# Patient Record
Sex: MALE | Race: WHITE | NOT HISPANIC OR LATINO | Employment: FULL TIME | URBAN - METROPOLITAN AREA
[De-identification: names, ages, dates, MRNs, and addresses within clinical notes are randomized per-mention and may not be internally consistent; named-entity substitution may affect disease eponyms.]

---

## 2018-04-05 ENCOUNTER — TELEPHONE (OUTPATIENT)
Dept: INTERNAL MEDICINE | Facility: CLINIC | Age: 38
End: 2018-04-05

## 2018-04-05 NOTE — TELEPHONE ENCOUNTER
----- Message from Kayla Arechiga sent at 4/5/2018  3:48 PM CDT -----  Contact: Pt   Pt request call back to discuss getting a refill on his Valacyclovir 1 gram without seeing him in office. .[phone] (home)

## 2018-04-06 ENCOUNTER — LAB VISIT (OUTPATIENT)
Dept: LAB | Facility: HOSPITAL | Age: 38
End: 2018-04-06
Attending: NURSE PRACTITIONER
Payer: COMMERCIAL

## 2018-04-06 ENCOUNTER — OFFICE VISIT (OUTPATIENT)
Dept: INTERNAL MEDICINE | Facility: CLINIC | Age: 38
End: 2018-04-06
Payer: COMMERCIAL

## 2018-04-06 VITALS
DIASTOLIC BLOOD PRESSURE: 78 MMHG | TEMPERATURE: 98 F | HEIGHT: 72 IN | HEART RATE: 68 BPM | SYSTOLIC BLOOD PRESSURE: 134 MMHG | OXYGEN SATURATION: 97 % | WEIGHT: 165.13 LBS | BODY MASS INDEX: 22.37 KG/M2

## 2018-04-06 DIAGNOSIS — B00.9 HERPES SIMPLEX TYPE 2 INFECTION: ICD-10-CM

## 2018-04-06 DIAGNOSIS — G47.26 SHIFT WORK SLEEP DISORDER: Primary | ICD-10-CM

## 2018-04-06 LAB
ANION GAP SERPL CALC-SCNC: 9 MMOL/L
BUN SERPL-MCNC: 16 MG/DL
CALCIUM SERPL-MCNC: 9.6 MG/DL
CHLORIDE SERPL-SCNC: 103 MMOL/L
CO2 SERPL-SCNC: 27 MMOL/L
CREAT SERPL-MCNC: 1.2 MG/DL
EST. GFR  (AFRICAN AMERICAN): >60 ML/MIN/1.73 M^2
EST. GFR  (NON AFRICAN AMERICAN): >60 ML/MIN/1.73 M^2
GLUCOSE SERPL-MCNC: 95 MG/DL
POTASSIUM SERPL-SCNC: 3.9 MMOL/L
SODIUM SERPL-SCNC: 139 MMOL/L

## 2018-04-06 PROCEDURE — 36415 COLL VENOUS BLD VENIPUNCTURE: CPT | Mod: PO

## 2018-04-06 PROCEDURE — 99999 PR PBB SHADOW E&M-EST. PATIENT-LVL III: CPT | Mod: PBBFAC,,, | Performed by: NURSE PRACTITIONER

## 2018-04-06 PROCEDURE — 99203 OFFICE O/P NEW LOW 30 MIN: CPT | Mod: S$GLB,,, | Performed by: NURSE PRACTITIONER

## 2018-04-06 PROCEDURE — 80048 BASIC METABOLIC PNL TOTAL CA: CPT | Mod: PO

## 2018-04-06 RX ORDER — TRAZODONE HYDROCHLORIDE 100 MG/1
100 TABLET ORAL NIGHTLY
Qty: 30 TABLET | Refills: 0 | Status: SHIPPED | OUTPATIENT
Start: 2018-04-06 | End: 2019-05-30

## 2018-04-06 RX ORDER — VALACYCLOVIR HYDROCHLORIDE 1 G/1
1000 TABLET, FILM COATED ORAL DAILY
COMMUNITY
End: 2018-04-06 | Stop reason: SDUPTHER

## 2018-04-06 RX ORDER — VALACYCLOVIR HYDROCHLORIDE 1 G/1
1000 TABLET, FILM COATED ORAL DAILY
Qty: 90 TABLET | Refills: 3 | Status: SHIPPED | OUTPATIENT
Start: 2018-04-06 | End: 2019-04-30 | Stop reason: SDUPTHER

## 2018-04-06 NOTE — PROGRESS NOTES
Subjective:       Patient ID: Trey Austin is a 38 y.o. male.    Chief Complaint: Medication Refill (Dr. Narvaez pt)    Pt here for refill and insomnia    No acute symptoms today     Refill- on valcyclovir. Has taken it daily for quite some time for prevention of Herpes (genital) outbreaks. No current outbreak.     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 He usually takes Melatonin, but would like to try Trazodone for when he works nights.       Review of Systems   Constitutional: Negative for activity change, appetite change, chills, diaphoresis, fatigue, fever and unexpected weight change.   HENT: Negative for congestion, ear pain, postnasal drip, rhinorrhea, sinus pain, sinus pressure, sneezing, sore throat, tinnitus, trouble swallowing and voice change.    Eyes: Negative for photophobia, pain and visual disturbance.   Respiratory: Negative for cough, chest tightness, shortness of breath and wheezing.    Cardiovascular: Negative for chest pain, palpitations and leg swelling.   Gastrointestinal: Negative for abdominal distention, abdominal pain, blood in stool, constipation, diarrhea, nausea and vomiting.   Genitourinary: Negative for dysuria and frequency.   Musculoskeletal: Negative for arthralgias, back pain, joint swelling, neck pain and neck stiffness.   Neurological: Negative for dizziness, tremors, seizures, syncope, facial asymmetry, speech difficulty, weakness, light-headedness, numbness and headaches.   Psychiatric/Behavioral: Positive for sleep disturbance. Negative for confusion.       Objective:      Physical Exam   Constitutional: He is oriented to person, place, and time. He appears well-developed and well-nourished.   HENT:   Head: Normocephalic and atraumatic.   Right Ear: Tympanic membrane normal.   Left Ear: Tympanic membrane normal.   Eyes: Conjunctivae and EOM are normal. Pupils are equal, round, and reactive to light.   Neck: Normal range  of motion. Neck supple.   Cardiovascular: Normal rate, regular rhythm, normal heart sounds and intact distal pulses.    Pulmonary/Chest: Effort normal and breath sounds normal.   Abdominal: Soft. Bowel sounds are normal.   Musculoskeletal: Normal range of motion.   Neurological: He is alert and oriented to person, place, and time.   Skin: Skin is warm and dry.   Psychiatric: He has a normal mood and affect.       Assessment:       1. Shift work sleep disorder    2. Herpes simplex type 2 infection        Plan:   Shift work sleep disorder  -     traZODone (DESYREL) 100 MG tablet; Take 1 tablet (100 mg total) by mouth every evening.  Dispense: 30 tablet; Refill: 0    Herpes simplex type 2 infection  -     valACYclovir (VALTREX) 1000 MG tablet; Take 1 tablet (1,000 mg total) by mouth once daily.  Dispense: 90 tablet; Refill: 3  -     Basic metabolic panel; Future; Expected date: 04/06/2018      Labs today-review to follow  meds as above  Side effects of trazodone discuss. Discontinue if unwanted side effects or adverse effects occur  Follow up with PCP

## 2019-04-30 DIAGNOSIS — B00.9 HERPES SIMPLEX TYPE 2 INFECTION: ICD-10-CM

## 2019-04-30 RX ORDER — VALACYCLOVIR HYDROCHLORIDE 1 G/1
TABLET, FILM COATED ORAL
Qty: 90 TABLET | Refills: 0 | Status: SHIPPED | OUTPATIENT
Start: 2019-04-30 | End: 2019-05-30 | Stop reason: SDUPTHER

## 2019-05-30 ENCOUNTER — OFFICE VISIT (OUTPATIENT)
Dept: INTERNAL MEDICINE | Facility: CLINIC | Age: 39
End: 2019-05-30
Payer: COMMERCIAL

## 2019-05-30 ENCOUNTER — LAB VISIT (OUTPATIENT)
Dept: LAB | Facility: HOSPITAL | Age: 39
End: 2019-05-30
Attending: FAMILY MEDICINE
Payer: COMMERCIAL

## 2019-05-30 VITALS
SYSTOLIC BLOOD PRESSURE: 112 MMHG | DIASTOLIC BLOOD PRESSURE: 78 MMHG | HEART RATE: 67 BPM | TEMPERATURE: 98 F | BODY MASS INDEX: 22.4 KG/M2 | WEIGHT: 165.38 LBS | OXYGEN SATURATION: 98 % | HEIGHT: 72 IN

## 2019-05-30 DIAGNOSIS — Z00.00 PREVENTATIVE HEALTH CARE: ICD-10-CM

## 2019-05-30 DIAGNOSIS — Z29.9 PREVENTIVE MEASURE: Primary | ICD-10-CM

## 2019-05-30 DIAGNOSIS — G47.26 SHIFT WORK SLEEP DISORDER: ICD-10-CM

## 2019-05-30 DIAGNOSIS — Z13.220 SCREENING FOR LIPID DISORDERS: ICD-10-CM

## 2019-05-30 DIAGNOSIS — B00.9 HERPES SIMPLEX TYPE 2 INFECTION: ICD-10-CM

## 2019-05-30 LAB
CHOLEST SERPL-MCNC: 213 MG/DL (ref 120–199)
CHOLEST/HDLC SERPL: 3.3 {RATIO} (ref 2–5)
HDLC SERPL-MCNC: 65 MG/DL (ref 40–75)
HDLC SERPL: 30.5 % (ref 20–50)
LDLC SERPL CALC-MCNC: 130.2 MG/DL (ref 63–159)
NONHDLC SERPL-MCNC: 148 MG/DL
TRIGL SERPL-MCNC: 89 MG/DL (ref 30–150)

## 2019-05-30 PROCEDURE — 36415 COLL VENOUS BLD VENIPUNCTURE: CPT

## 2019-05-30 PROCEDURE — 99999 PR PBB SHADOW E&M-EST. PATIENT-LVL III: CPT | Mod: PBBFAC,,, | Performed by: FAMILY MEDICINE

## 2019-05-30 PROCEDURE — 99999 PR PBB SHADOW E&M-EST. PATIENT-LVL III: ICD-10-PCS | Mod: PBBFAC,,, | Performed by: FAMILY MEDICINE

## 2019-05-30 PROCEDURE — 90715 TDAP VACCINE 7 YRS/> IM: CPT | Mod: S$GLB,,, | Performed by: FAMILY MEDICINE

## 2019-05-30 PROCEDURE — 90471 TDAP VACCINE GREATER THAN OR EQUAL TO 7YO IM: ICD-10-PCS | Mod: S$GLB,,, | Performed by: FAMILY MEDICINE

## 2019-05-30 PROCEDURE — 90715 TDAP VACCINE GREATER THAN OR EQUAL TO 7YO IM: ICD-10-PCS | Mod: S$GLB,,, | Performed by: FAMILY MEDICINE

## 2019-05-30 PROCEDURE — 99396 PR PREVENTIVE VISIT,EST,40-64: ICD-10-PCS | Mod: 25,S$GLB,, | Performed by: FAMILY MEDICINE

## 2019-05-30 PROCEDURE — 99396 PREV VISIT EST AGE 40-64: CPT | Mod: 25,S$GLB,, | Performed by: FAMILY MEDICINE

## 2019-05-30 PROCEDURE — 90471 IMMUNIZATION ADMIN: CPT | Mod: S$GLB,,, | Performed by: FAMILY MEDICINE

## 2019-05-30 PROCEDURE — 80061 LIPID PANEL: CPT

## 2019-05-30 RX ORDER — MODAFINIL 200 MG/1
200 TABLET ORAL DAILY
Qty: 30 TABLET | Refills: 0 | Status: SHIPPED | OUTPATIENT
Start: 2019-05-30 | End: 2019-06-29

## 2019-05-30 RX ORDER — VALACYCLOVIR HYDROCHLORIDE 1 G/1
1000 TABLET, FILM COATED ORAL DAILY
Qty: 90 TABLET | Refills: 3 | Status: SHIPPED | OUTPATIENT
Start: 2019-05-30 | End: 2020-06-25 | Stop reason: SDUPTHER

## 2019-05-30 RX ORDER — ZOLPIDEM TARTRATE 10 MG/1
TABLET ORAL
Qty: 30 TABLET | Refills: 1 | Status: SHIPPED | OUTPATIENT
Start: 2019-05-30 | End: 2020-06-25 | Stop reason: SDUPTHER

## 2019-05-30 NOTE — ASSESSMENT & PLAN NOTE
He works shift work, and he reports that whenever he has to transition from day to night shift, he has difficulty realigned in his sleep-wake pattern.  He has been using modafinil 200 milligrams only when he makes the transition, typically no more than a couple of tablets per month.  He says that the trazodone causes side effects of dry mouth and feeling groggy.  He has used alprazolam in the past, which apparently worked well and he tolerated it well, but he wants to try a non-benzodiazepine, such as zolpidem.

## 2019-05-30 NOTE — PROGRESS NOTES
CHIEF COMPLAINT  Establish Care and Annual Exam      HISTORY OF PRESENT ILLNESS (PREVENTIVE SERVICES)    HEALTH MAINTENANCE INTERVENTIONS - UP TO DATE  Health Maintenance Topics with due status: Not Due       Topic Last Completion Date    Influenza Vaccine Not Due       HEALTH MAINTENANCE INTERVENTIONS - DUE OR DUE SOON  Health Maintenance Due   Topic Date Due    Lipid Panel  1979    TETANUS VACCINE  01/04/1997       · HYPERTENSION SCREENING: negative  BP Readings from Last 3 Encounters:   05/30/19 112/78   04/06/18 134/78        · OBESITY SCREENING: negative  Wt Readings from Last 3 Encounters:   05/30/19 75 kg (165 lb 5.5 oz)   04/06/18 74.9 kg (165 lb 2 oz)     BMI Readings from Last 3 Encounters:   05/30/19 22.42 kg/m²   04/06/18 22.39 kg/m²        · DYSLIPIDEMIA SCREENING: ordered  No results found for: CHOL, TRIG, HDL, LDLCALC, NONHDLCHOL    · DIABETES SCREENING: current - not needed at this time  Lab Results   Component Value Date    GLU 95 04/06/2018       · HIV SCREENING: offered, he declined   No results found for: UHA25RIPC     · TOBACCO USE SCREENING: negative  He  reports that he has quit smoking. He has never used smokeless tobacco.    · ALCOHOL MISUSE SCREENING: negative  He  has no alcohol history on file.    · DEPRESSION SCREENING: negative    · IMMUNIZATIONS: reported as up to date according to current CDC guidelines except for those ordered today.    · VISION SCREENING: current - not needed at this time    · ASPIRIN: not indicated    · ABDOMINAL AORTIC ANEURYSM SCREENING: not indicated    · COLORECTAL CANCER SCREENING: not indicated    · PROSTATE CANCER SCREENING: not indicated  No results found for: PSA     Problem List Items Addressed This Visit        ID    Herpes simplex type 2 infection (genital) (Chronic)    Current Assessment & Plan     Suppressed to only a couple of outbreaks per year on valacyclovir 1000 milligrams daily.  He wants to continue this regimen.         Relevant  Medications    valACYclovir (VALTREX) 1000 MG tablet       Other    Shift work sleep disorder    Current Assessment & Plan     He works shift work, and he reports that whenever he has to transition from day to night shift, he has difficulty realigned in his sleep-wake pattern.  He has been using modafinil 200 milligrams only when he makes the transition, typically no more than a couple of tablets per month.  He says that the trazodone causes side effects of dry mouth and feeling groggy.  He has used alprazolam in the past, which apparently worked well and he tolerated it well, but he wants to try a non-benzodiazepine, such as zolpidem.         Relevant Medications    modafinil (PROVIGIL) 200 MG Tab    zolpidem (AMBIEN) 10 mg Tab      Other Visit Diagnoses     Preventive measure    -  Primary    Relevant Orders    (In Office Administered) Tdap Vaccine (Completed)    Preventative health care        Screening for lipid disorders        Relevant Orders    Lipid panel          REVIEW OF SYSTEMS  CONSTITUTIONAL: No fever reported.  CARDIOVASCULAR: No angina reported.  PULMONARY: No hemoptysis reported.  PSYCHIATRIC: No mood instability reported.  NEUROLOGIC: No seizures reported.  ENDOCRINE: No polydipsia reported.  GASTROINTESTINAL: No blood in stool reported.  GENITOURINARY: No hematuria reported.  ENT: No acute hearing changes reported.  OPHTHALMOLOGIC: No acute vision changes reported.  HEMATOLOGIC: No bleeding problems reported.  MUSCULOSKELETAL: No recent injuries reported.  DERMATOLOGIC: No rash reported.  REMAINDER OF COMPLETE REVIEW OF SYSTEMS is negative or noncontributory to present illness except as noted herein.     PHYSICAL EXAM  Vitals:    05/30/19 1012   BP: 112/78   BP Location: Right arm   Patient Position: Sitting   Pulse: 67   Temp: 97.7 °F (36.5 °C)   TempSrc: Tympanic   SpO2: 98%   Weight: 75 kg (165 lb 5.5 oz)   Height: 6' (1.829 m)     CONSTITUTIONAL: Vital signs noted. No apparent distress. Does  not appear acutely ill or septic. Appears adequately hydrated.  EYE: Sclerae anicteric. Lids and conjunctiva unremarkable.  ENT: External ENT unremarkable. Oropharynx moist. Ear canals had excessive cerumen bilaterally, which was easily removed with curette without complication.  Subsequent exam revealed clear canals and normal tympanic membranes normal. Hearing grossly normal.   NECK: Trachea midline. Thyroid nontender.  PULM: Lungs clear. Breathing unlabored.  CV: Auscultation reveals regular rate and rhythm without murmur, gallop or rub. No carotid bruit.  GI: Soft and nontender. Bowel sounds normal.  DERM: Skin warm and moist with normal turgor.  PSYCH: Alert and oriented x 3. Mood is grossly neutral. Affect appropriate. Judgment and insight not grossly compromised.    Follow up in about 1 year (around 5/30/2020) for wellness and preventive services.    ABOUT THIS DOCUMENTATION:  · Documentation entered by me for this encounter was done in part using speech-recognition technology. Although I have made an effort to ensure accuracy, malapropisms may exist and should be interpreted in context.                        LEIGHTON Narvaez MD

## 2019-05-30 NOTE — ASSESSMENT & PLAN NOTE
Suppressed to only a couple of outbreaks per year on valacyclovir 1000 milligrams daily.  He wants to continue this regimen.

## 2019-08-15 NOTE — PROGRESS NOTES
"Trey Limon.      I'm happy to report that these test results are NORMAL or at least ACCEPTABLE.    Want to learn more about your test results and what they mean? It's as simple as 1, 2, 3.     (1) Log in to your [facility] account at [email or web address]     (2) From the "View test results" tab, click on the test you want to know more about.     (3) Click on the "About This Test" link.    We can discuss your test results further at your next appointment.  If you have any questions, be sure to write them down and bring them to your appointment.  If you have any urgent questions in the meantime, please send me a message using [facility], or you can call my office at [phone].    Thanks for letting me care for you, thanks for trusting us with your healthcare needs, and thanks for using [facility].    Sincerely,    ELENO Narvaez MD"

## 2020-06-16 DIAGNOSIS — B00.9 HERPES SIMPLEX TYPE 2 INFECTION: ICD-10-CM

## 2020-06-25 ENCOUNTER — OFFICE VISIT (OUTPATIENT)
Dept: INTERNAL MEDICINE | Facility: CLINIC | Age: 40
End: 2020-06-25
Payer: COMMERCIAL

## 2020-06-25 DIAGNOSIS — G47.26 SHIFT WORK SLEEP DISORDER: ICD-10-CM

## 2020-06-25 DIAGNOSIS — B00.9 HERPES SIMPLEX TYPE 2 INFECTION: ICD-10-CM

## 2020-06-25 DIAGNOSIS — B00.9 HERPES SIMPLEX TYPE 2 INFECTION: Chronic | ICD-10-CM

## 2020-06-25 PROCEDURE — 99213 OFFICE O/P EST LOW 20 MIN: CPT | Mod: 95,,, | Performed by: FAMILY MEDICINE

## 2020-06-25 PROCEDURE — 99213 PR OFFICE/OUTPT VISIT, EST, LEVL III, 20-29 MIN: ICD-10-PCS | Mod: 95,,, | Performed by: FAMILY MEDICINE

## 2020-06-25 RX ORDER — VALACYCLOVIR HYDROCHLORIDE 1 G/1
1000 TABLET, FILM COATED ORAL DAILY
Qty: 90 TABLET | Refills: 4 | Status: SHIPPED | OUTPATIENT
Start: 2020-06-25 | End: 2021-04-26 | Stop reason: SDUPTHER

## 2020-06-25 RX ORDER — ZOLPIDEM TARTRATE 10 MG/1
TABLET ORAL
Qty: 30 TABLET | Refills: 3 | Status: SHIPPED | OUTPATIENT
Start: 2020-06-25 | End: 2021-03-05 | Stop reason: SDUPTHER

## 2020-06-25 NOTE — ASSESSMENT & PLAN NOTE
He works shift work, and he reports that whenever he has to transition from day to night shift, he has difficulty realigned in his sleep-wake pattern.  He has been using modafinil 200 milligrams only when he makes the transition, typically no more than a couple of tablets per month.  He says that the trazodone causes side effects of dry mouth and feeling groggy.  He has used alprazolam in the past, which apparently worked well and he tolerated it well, but he prefers zolpidem.

## 2020-06-25 NOTE — PROGRESS NOTES
TELEMEDICINE VIRTUAL VISIT    CHIEF COMPLAINT  Follow-up and Results      DIAGNOSES, HISTORY, ASSESSMENT, AND PLAN  Assessment   1. Herpes simplex type 2 infection (genital)    2. Shift work sleep disorder    3. Herpes simplex type 2 infection         Plan   Problem List Items Addressed This Visit        Unprioritized    Herpes simplex type 2 infection (genital) (Chronic)    Current Assessment & Plan     Suppressed to only a couple of outbreaks per year on valacyclovir 1000 milligrams daily.  He wants to continue this regimen.         Relevant Medications    valACYclovir (VALTREX) 1000 MG tablet    Shift work sleep disorder    Current Assessment & Plan     He works shift work, and he reports that whenever he has to transition from day to night shift, he has difficulty realigned in his sleep-wake pattern.  He has been using modafinil 200 milligrams only when he makes the transition, typically no more than a couple of tablets per month.  He says that the trazodone causes side effects of dry mouth and feeling groggy.  He has used alprazolam in the past, which apparently worked well and he tolerated it well, but he prefers zolpidem.         Relevant Medications    zolpidem (AMBIEN) 10 mg Tab          Orders Placed This Encounter    zolpidem (AMBIEN) 10 mg Tab    valACYclovir (VALTREX) 1000 MG tablet       Outpatient Medications Prior to Visit   Medication Sig Dispense Refill    valACYclovir (VALTREX) 1000 MG tablet Take 1 tablet (1,000 mg total) by mouth once daily. 90 tablet 3    zolpidem (AMBIEN) 10 mg Tab Take one-half tablet (5 mg) or one tablet (10 mg) at bedtime as needed for insomnia 30 tablet 1     No facility-administered medications prior to visit.         Medications Discontinued During This Encounter   Medication Reason    valACYclovir (VALTREX) 1000 MG tablet Reorder    zolpidem (AMBIEN) 10 mg Tab Reorder        Medications Ordered This Encounter   Medications    valACYclovir (VALTREX) 1000 MG tablet  "    Sig: Take 1 tablet (1,000 mg total) by mouth once daily.     Dispense:  90 tablet     Refill:  4    zolpidem (AMBIEN) 10 mg Tab     Sig: Take one-half tablet (5 mg) or one tablet (10 mg) at bedtime as needed for insomnia     Dispense:  30 tablet     Refill:  3       Follow up in about 1 year (around 6/25/2021) for wellness and preventive services.     Subjective   Review of Systems   Constitutional: Negative for activity change and unexpected weight change.   HENT: Negative for hearing loss, rhinorrhea and trouble swallowing.    Eyes: Negative for discharge and visual disturbance.   Respiratory: Negative for chest tightness and wheezing.    Cardiovascular: Negative for chest pain and palpitations.   Gastrointestinal: Negative for blood in stool, constipation, diarrhea and vomiting.   Endocrine: Negative for polydipsia.   Genitourinary: Negative for difficulty urinating and hematuria.   Musculoskeletal: Negative for arthralgias, joint swelling and neck pain.   Neurological: Negative for headaches.   Psychiatric/Behavioral: Negative for confusion and dysphoric mood.        Objective   CONSTITUTIONAL: No apparent distress. Appears comfortable. Does not appear acutely ill or septic. Appears adequately hydrated.  CARDIOVASCULAR: No perioral cyanosis.  PULMONARY: Breathing unlabored. No audible wheezes. Easily speaks in full sentences.  PSYCHIATRIC: Alert and conversant and grossly oriented. Mood is grossly neutral. Affect appropriate. Judgment and insight grossly intact.     Documentation entered by me for this encounter may have been done in part using speech-recognition technology. Although I have made an effort to ensure accuracy, "sound like" errors may exist and should be interpreted in context. -ELENO Narvaez MD.    Visit Details: This visit was a telemedicine virtual visit with synchronous audio and video. Trey reported that his location at the time of this visit was in the [facility]. Trey " chose and consented to receive these medical services by telemedicine. TOTAL TIME evaluating and managing this patient for this encounter exceeded 15 minutes, the majority spent counseling and coordinating care for the listed diagnoses.

## 2020-06-28 RX ORDER — VALACYCLOVIR HYDROCHLORIDE 1 G/1
TABLET, FILM COATED ORAL
Qty: 90 TABLET | Refills: 0 | Status: SHIPPED | OUTPATIENT
Start: 2020-06-28 | End: 2021-04-26 | Stop reason: SDUPTHER

## 2020-09-25 ENCOUNTER — PATIENT MESSAGE (OUTPATIENT)
Dept: OTHER | Facility: OTHER | Age: 40
End: 2020-09-25

## 2021-03-05 DIAGNOSIS — G47.26 SHIFT WORK SLEEP DISORDER: ICD-10-CM

## 2021-03-05 RX ORDER — ZOLPIDEM TARTRATE 10 MG/1
TABLET ORAL
Qty: 30 TABLET | Refills: 3 | Status: SHIPPED | OUTPATIENT
Start: 2021-03-05 | End: 2021-04-26 | Stop reason: SDUPTHER

## 2021-04-26 ENCOUNTER — OFFICE VISIT (OUTPATIENT)
Dept: INTERNAL MEDICINE | Facility: CLINIC | Age: 41
End: 2021-04-26
Payer: COMMERCIAL

## 2021-04-26 ENCOUNTER — PATIENT MESSAGE (OUTPATIENT)
Dept: INTERNAL MEDICINE | Facility: CLINIC | Age: 41
End: 2021-04-26

## 2021-04-26 DIAGNOSIS — G47.26 SHIFT WORK SLEEP DISORDER: Primary | ICD-10-CM

## 2021-04-26 DIAGNOSIS — Z13.1 SCREENING FOR DIABETES MELLITUS: ICD-10-CM

## 2021-04-26 DIAGNOSIS — B00.9 HERPES SIMPLEX TYPE 2 INFECTION: ICD-10-CM

## 2021-04-26 DIAGNOSIS — Z13.220 SCREENING FOR LIPID DISORDERS: ICD-10-CM

## 2021-04-26 PROBLEM — Z86.006 HISTORY OF MELANOMA IN SITU: Status: ACTIVE | Noted: 2020-06-10

## 2021-04-26 PROCEDURE — 99213 OFFICE O/P EST LOW 20 MIN: CPT | Mod: 95,,, | Performed by: FAMILY MEDICINE

## 2021-04-26 PROCEDURE — 99213 PR OFFICE/OUTPT VISIT, EST, LEVL III, 20-29 MIN: ICD-10-PCS | Mod: 95,,, | Performed by: FAMILY MEDICINE

## 2021-04-26 RX ORDER — ZOLPIDEM TARTRATE 10 MG/1
TABLET ORAL
Qty: 30 TABLET | Refills: 5 | Status: SHIPPED | OUTPATIENT
Start: 2021-04-26 | End: 2021-11-09 | Stop reason: SDUPTHER

## 2021-04-26 RX ORDER — VALACYCLOVIR HYDROCHLORIDE 1 G/1
1000 TABLET, FILM COATED ORAL DAILY
Qty: 90 TABLET | Refills: 4 | Status: SHIPPED | OUTPATIENT
Start: 2021-04-26 | End: 2021-07-21

## 2021-05-18 ENCOUNTER — TELEPHONE (OUTPATIENT)
Dept: INTERNAL MEDICINE | Facility: CLINIC | Age: 41
End: 2021-05-18

## 2021-07-14 DIAGNOSIS — B00.9 HERPES SIMPLEX TYPE 2 INFECTION: ICD-10-CM

## 2021-07-21 RX ORDER — VALACYCLOVIR HYDROCHLORIDE 1 G/1
TABLET, FILM COATED ORAL
Qty: 90 TABLET | Refills: 4 | Status: SHIPPED | OUTPATIENT
Start: 2021-07-21 | End: 2022-02-18 | Stop reason: SDUPTHER

## 2021-11-09 DIAGNOSIS — G47.26 SHIFT WORK SLEEP DISORDER: ICD-10-CM

## 2021-11-12 ENCOUNTER — TELEPHONE (OUTPATIENT)
Dept: INTERNAL MEDICINE | Facility: CLINIC | Age: 41
End: 2021-11-12
Payer: COMMERCIAL

## 2021-11-12 RX ORDER — ZOLPIDEM TARTRATE 10 MG/1
TABLET ORAL
Qty: 30 TABLET | Refills: 1 | Status: SHIPPED | OUTPATIENT
Start: 2021-11-12 | End: 2022-02-18 | Stop reason: SDUPTHER

## 2022-02-18 ENCOUNTER — OFFICE VISIT (OUTPATIENT)
Dept: INTERNAL MEDICINE | Facility: CLINIC | Age: 42
End: 2022-02-18
Payer: COMMERCIAL

## 2022-02-18 VITALS
TEMPERATURE: 98 F | SYSTOLIC BLOOD PRESSURE: 100 MMHG | HEIGHT: 72 IN | WEIGHT: 162.06 LBS | BODY MASS INDEX: 21.95 KG/M2 | DIASTOLIC BLOOD PRESSURE: 70 MMHG | HEART RATE: 76 BPM | OXYGEN SATURATION: 98 %

## 2022-02-18 DIAGNOSIS — Z86.006 HISTORY OF MELANOMA IN SITU: ICD-10-CM

## 2022-02-18 DIAGNOSIS — Z00.00 PREVENTATIVE HEALTH CARE: Primary | ICD-10-CM

## 2022-02-18 DIAGNOSIS — Z13.220 SCREENING FOR LIPID DISORDERS: ICD-10-CM

## 2022-02-18 DIAGNOSIS — Z86.16 HISTORY OF COVID-19: ICD-10-CM

## 2022-02-18 DIAGNOSIS — H61.23 BILATERAL IMPACTED CERUMEN: ICD-10-CM

## 2022-02-18 DIAGNOSIS — B00.9 HERPES SIMPLEX TYPE 2 INFECTION: ICD-10-CM

## 2022-02-18 DIAGNOSIS — G47.26 SHIFT WORK SLEEP DISORDER: ICD-10-CM

## 2022-02-18 LAB
CHOLEST SERPL-MCNC: 207 MG/DL (ref 120–199)
CHOLEST/HDLC SERPL: 3.5 {RATIO} (ref 2–5)
HDLC SERPL-MCNC: 60 MG/DL (ref 40–75)
HDLC SERPL: 29 % (ref 20–50)
LDLC SERPL CALC-MCNC: 132.2 MG/DL (ref 63–159)
NONHDLC SERPL-MCNC: 147 MG/DL
TRIGL SERPL-MCNC: 74 MG/DL (ref 30–150)

## 2022-02-18 PROCEDURE — 69210 PR REMOVAL IMPACTED CERUMEN REQUIRING INSTRUMENTATION, UNILATERAL: ICD-10-PCS | Mod: S$GLB,,, | Performed by: FAMILY MEDICINE

## 2022-02-18 PROCEDURE — 3074F SYST BP LT 130 MM HG: CPT | Mod: CPTII,S$GLB,, | Performed by: FAMILY MEDICINE

## 2022-02-18 PROCEDURE — 3078F PR MOST RECENT DIASTOLIC BLOOD PRESSURE < 80 MM HG: ICD-10-PCS | Mod: CPTII,S$GLB,, | Performed by: FAMILY MEDICINE

## 2022-02-18 PROCEDURE — 1159F MED LIST DOCD IN RCRD: CPT | Mod: CPTII,S$GLB,, | Performed by: FAMILY MEDICINE

## 2022-02-18 PROCEDURE — 69210 REMOVE IMPACTED EAR WAX UNI: CPT | Mod: S$GLB,,, | Performed by: FAMILY MEDICINE

## 2022-02-18 PROCEDURE — 3008F BODY MASS INDEX DOCD: CPT | Mod: CPTII,S$GLB,, | Performed by: FAMILY MEDICINE

## 2022-02-18 PROCEDURE — 3078F DIAST BP <80 MM HG: CPT | Mod: CPTII,S$GLB,, | Performed by: FAMILY MEDICINE

## 2022-02-18 PROCEDURE — 3008F PR BODY MASS INDEX (BMI) DOCUMENTED: ICD-10-PCS | Mod: CPTII,S$GLB,, | Performed by: FAMILY MEDICINE

## 2022-02-18 PROCEDURE — 99999 PR PBB SHADOW E&M-EST. PATIENT-LVL III: CPT | Mod: PBBFAC,,, | Performed by: FAMILY MEDICINE

## 2022-02-18 PROCEDURE — 99396 PREV VISIT EST AGE 40-64: CPT | Mod: 25,S$GLB,, | Performed by: FAMILY MEDICINE

## 2022-02-18 PROCEDURE — 1160F RVW MEDS BY RX/DR IN RCRD: CPT | Mod: CPTII,S$GLB,, | Performed by: FAMILY MEDICINE

## 2022-02-18 PROCEDURE — 3074F PR MOST RECENT SYSTOLIC BLOOD PRESSURE < 130 MM HG: ICD-10-PCS | Mod: CPTII,S$GLB,, | Performed by: FAMILY MEDICINE

## 2022-02-18 PROCEDURE — 1159F PR MEDICATION LIST DOCUMENTED IN MEDICAL RECORD: ICD-10-PCS | Mod: CPTII,S$GLB,, | Performed by: FAMILY MEDICINE

## 2022-02-18 PROCEDURE — 99396 PR PREVENTIVE VISIT,EST,40-64: ICD-10-PCS | Mod: 25,S$GLB,, | Performed by: FAMILY MEDICINE

## 2022-02-18 PROCEDURE — 80061 LIPID PANEL: CPT | Performed by: FAMILY MEDICINE

## 2022-02-18 PROCEDURE — 1160F PR REVIEW ALL MEDS BY PRESCRIBER/CLIN PHARMACIST DOCUMENTED: ICD-10-PCS | Mod: CPTII,S$GLB,, | Performed by: FAMILY MEDICINE

## 2022-02-18 PROCEDURE — 99999 PR PBB SHADOW E&M-EST. PATIENT-LVL III: ICD-10-PCS | Mod: PBBFAC,,, | Performed by: FAMILY MEDICINE

## 2022-02-18 RX ORDER — MODAFINIL 200 MG/1
200 TABLET ORAL DAILY PRN
Qty: 30 TABLET | Refills: 0 | Status: SHIPPED | OUTPATIENT
Start: 2022-02-18 | End: 2022-04-25

## 2022-02-18 RX ORDER — ZOLPIDEM TARTRATE 10 MG/1
10 TABLET ORAL NIGHTLY PRN
Qty: 30 TABLET | Refills: 1 | Status: SHIPPED | OUTPATIENT
Start: 2022-02-18

## 2022-02-18 RX ORDER — VALACYCLOVIR HYDROCHLORIDE 1 G/1
1000 TABLET, FILM COATED ORAL DAILY
Qty: 90 TABLET | Refills: 4 | Status: SHIPPED | OUTPATIENT
Start: 2022-02-18 | End: 2022-09-01

## 2022-02-18 NOTE — PROGRESS NOTES
WELLNESS VISIT (PREVENTIVE SERVICES)  2/18/22 10:30 AM CST  LAST ENCOUNTER WITH ME:  4/26/2021   HEALTH MAINTENANCE INTERVENTIONS - UP TO DATE  Health Maintenance Topics with due status: Not Due       Topic Last Completion Date    TETANUS VACCINE 05/30/2019    Lipid Panel 02/18/2022       HEALTH MAINTENANCE INTERVENTIONS - DUE OR DUE SOON  There are no preventive care reminders to display for this patient.    CHIEF COMPLAINT: Annual Wellness Visit (Preventive Services)    DIAGNOSES SPECIFICALLY EVALUATED AND TREATED THIS ENCOUNTER  1. Preventative health care  - Lipid Panel    2. History of COVID-19 (01/03/2022)    3. Herpes simplex type 2 infection  - valACYclovir (VALTREX) 1000 MG tablet; Take 1 tablet (1,000 mg total) by mouth once daily.  Dispense: 90 tablet; Refill: 4    4. Shift work sleep disorder  - zolpidem (AMBIEN) 10 mg Tab; Take 1 tablet (10 mg total) by mouth nightly as needed (for insomnia). Take one-half tablet (5 mg) or one tablet (10 mg) at bedtime as needed for insomnia  Dispense: 30 tablet; Refill: 1  - modafiniL (PROVIGIL) 200 MG Tab; Take 1 tablet (200 mg total) by mouth daily as needed (for shift-work sleep disorder).  Dispense: 30 tablet; Refill: 0    5. Screening for lipid disorders  - Lipid Panel    6. History of melanoma in situ     Follow up in about 1 year (around 2/18/2023) for wellness and preventive services.    Problem List Items Addressed This Visit     Herpes simplex type 2 infection (genital) (Chronic)    Relevant Medications    valACYclovir (VALTREX) 1000 MG tablet    Shift work sleep disorder    Relevant Medications    zolpidem (AMBIEN) 10 mg Tab    modafiniL (PROVIGIL) 200 MG Tab    History of melanoma in situ    Overview     DERMATOLOGIST: Hilary Holley MD.  11/2019 L cheek           History of COVID-19 (01/03/2022)      Other Visit Diagnoses     Preventative health care    -  Primary    Relevant Orders    Lipid Panel (Completed)    Screening for lipid disorders        Relevant  Orders    Lipid Panel (Completed)      Unless noted herein, any chronic conditions are represented as and appear compensated/controlled and stable, and no other significant complaints or concerns were reported.    PRESCRIPTION DRUG MANAGEMENT  Outpatient Medications Prior to Visit   Medication Sig Dispense Refill    valACYclovir (VALTREX) 1000 MG tablet TAKE 1 TABLET(1000 MG) BY MOUTH EVERY DAY 90 tablet 4    zolpidem (AMBIEN) 10 mg Tab Take one-half tablet (5 mg) or one tablet (10 mg) at bedtime as needed for insomnia 30 tablet 1     No facility-administered medications prior to visit.     Medications Discontinued During This Encounter   Medication Reason    valACYclovir (VALTREX) 1000 MG tablet Reorder    zolpidem (AMBIEN) 10 mg Tab Reorder     Medications Ordered This Encounter   Medications    modafiniL (PROVIGIL) 200 MG Tab     Sig: Take 1 tablet (200 mg total) by mouth daily as needed (for shift-work sleep disorder).     Dispense:  30 tablet     Refill:  0    valACYclovir (VALTREX) 1000 MG tablet     Sig: Take 1 tablet (1,000 mg total) by mouth once daily.     Dispense:  90 tablet     Refill:  4    zolpidem (AMBIEN) 10 mg Tab     Sig: Take 1 tablet (10 mg total) by mouth nightly as needed (for insomnia). Take one-half tablet (5 mg) or one tablet (10 mg) at bedtime as needed for insomnia     Dispense:  30 tablet     Refill:  1       PHYSICAL EXAM  Vitals:    02/18/22 1050   BP: 100/70   BP Location: Left arm   Patient Position: Sitting   BP Method: Medium (Manual)   Pulse: 76   Temp: 97.9 °F (36.6 °C)   TempSrc: Tympanic   SpO2: 98%   Weight: 73.5 kg (162 lb 0.6 oz)   Height: 6' (1.829 m)   CONSTITUTIONAL: Vital signs noted. No apparent distress. Does not appear acutely ill or septic. Appears adequately hydrated.  EYE: Sclerae anicteric. Lids and conjunctiva unremarkable.  ENT: External ENT unremarkable. Hearing grossly intact.  PULM: Lungs clear. Breathing unlabored.  CV: Auscultation reveals regular  "rate and rhythm. Normal heart sounds. No carotid bruit.  GI: Soft and nontender. Bowel sounds normal.  DERM: Skin warm and moist with normal turgor.  NEURO: There are no gross focal motor deficits or gross deficits of cranial nerves III-XII.  PSYCH: Alert and oriented x 3. Mood is grossly neutral. Affect appropriate. Judgment and insight grossly intact.        PROCEDURE NOTE    *PROCEDURE: Cerumen Disimpaction, BILATERAL  *SURGEON: ELENO Narvaez MD  *DIAGNOSIS: Cerumen impaction, BILATERAL  *INFORMED CONSENT:  After discussing diagnosis, nature and risks/benefits of procedure, and treatment alternatives, informed consent was obtained verbally.    *PRE-PROCEDURE SAFETY CHECK:  (1) Patient was patient identified by at least 2 means.  (2) Planned procedure was confirmed.  (3) Correct site was verified.  (4) Anticipated needed supplies were verified as available.    *PROCEDURE DESCRIPTION: Cerumen disimpaction was performed using disposable curette and irrigation.    *POST-PROCEDURE EXAM: Canals unobstructed. Visualized tympanic membranes intact.    *COMPLICATIONS: none    Ear hygiene education provided.    EAR CARE AFTER CERUMEN REMOVAL  · Mix white vinegar with equal amount of water to make half-strength white vinegar.  · Fill ear canals with half-strength white vinegar 3 times a day for the next 3 days to prevent a "swimmer's ear" type infection.    PAST MEDICAL HISTORY  Trey has a past medical history of Herpes simplex and Shift work sleep disorder.    SURGICAL HISTORY  Trey has a past surgical history that includes perforated samll intestine.    FAMILY HISTORY  Trey family history includes Breast cancer in his mother; Heart disease in his father.     ALLERGIES  Review of patient's allergies indicates:   Allergen Reactions    Erythromycin     Macrolide antibiotics Diarrhea and Nausea And Vomiting       SOCIAL HISTORY  Trey  reports that he has quit smoking. He has never used smokeless tobacco. He reports " "previous alcohol use. He reports that he does not use drugs.     Documentation entered by me for this encounter may have been done in part using speech-recognition technology. Although I have made an effort to ensure accuracy, "sound like" errors may exist and should be interpreted in context.   "

## 2022-02-28 NOTE — PROGRESS NOTES
"Hi, Trey.    I'm happy to report that these results are fine and do not require a change in treatment at this point. We'll review these results at your next appointment. I look forward to seeing you then.     Thanks for letting me care for you, and thanks for trusting [facility] with your healthcare needs.    All the best,    ELENO Narvaez MD  P.S. Want to learn more about your test results and what they mean? It's as simple as 1, 2, 3.     (1) Log in to your [facility] account at [email or web address]     (2) From the "Test Results" tab, click on the test you want to know more about.     (3) Click on the "About This Test" link. "

## 2022-03-03 ENCOUNTER — TELEPHONE (OUTPATIENT)
Dept: INTERNAL MEDICINE | Facility: CLINIC | Age: 42
End: 2022-03-03
Payer: COMMERCIAL

## 2022-03-03 NOTE — TELEPHONE ENCOUNTER
Per fax request rec'vd from pt's pharm intiatied prior auth request to tp's ins for Modafinil 200mg tablet rx #30/30, submitted online via [employer] Request KEY [id number]; Real-time APPROVAL rec'vd DOS 02/01/2022 - 03/03/2023 Case Id [id number], faxed approval to pt's pharm instrc them to notify pt when rx ready F#[phone].

## 2022-04-20 DIAGNOSIS — G47.26 SHIFT WORK SLEEP DISORDER: ICD-10-CM

## 2022-04-25 RX ORDER — MODAFINIL 200 MG/1
TABLET ORAL
Qty: 30 TABLET | Refills: 2 | Status: SHIPPED | OUTPATIENT
Start: 2022-04-25 | End: 2023-09-14

## 2022-04-25 NOTE — TELEPHONE ENCOUNTER
REFILL REQUEST APPROVED  Requested Prescriptions   Pending Prescriptions Disp Refills    modafiniL (PROVIGIL) 200 MG Tab [Pharmacy Med Name: MODAFINIL 200MG TABLETS] 30 tablet 2     Sig: TAKE 1 TABLET BY MOUTH DAILY AS NEEDED FOR SHIFT-WORK SLEEP DISORDER

## 2023-02-15 ENCOUNTER — OFFICE VISIT (OUTPATIENT)
Dept: INTERNAL MEDICINE | Facility: CLINIC | Age: 43
End: 2023-02-15
Payer: COMMERCIAL

## 2023-02-15 VITALS
HEIGHT: 72 IN | WEIGHT: 162.06 LBS | DIASTOLIC BLOOD PRESSURE: 60 MMHG | HEART RATE: 84 BPM | OXYGEN SATURATION: 96 % | TEMPERATURE: 98 F | SYSTOLIC BLOOD PRESSURE: 100 MMHG | BODY MASS INDEX: 21.95 KG/M2

## 2023-02-15 DIAGNOSIS — B00.9 HERPES SIMPLEX TYPE 2 INFECTION: ICD-10-CM

## 2023-02-15 DIAGNOSIS — G47.26 SHIFT WORK SLEEP DISORDER: Primary | ICD-10-CM

## 2023-02-15 DIAGNOSIS — Z13.220 SCREENING FOR LIPID DISORDERS: ICD-10-CM

## 2023-02-15 PROCEDURE — 99213 PR OFFICE/OUTPT VISIT, EST, LEVL III, 20-29 MIN: ICD-10-PCS | Mod: S$GLB,,, | Performed by: NURSE PRACTITIONER

## 2023-02-15 PROCEDURE — 99999 PR PBB SHADOW E&M-EST. PATIENT-LVL III: ICD-10-PCS | Mod: PBBFAC,,, | Performed by: NURSE PRACTITIONER

## 2023-02-15 PROCEDURE — 3008F PR BODY MASS INDEX (BMI) DOCUMENTED: ICD-10-PCS | Mod: CPTII,S$GLB,, | Performed by: NURSE PRACTITIONER

## 2023-02-15 PROCEDURE — 3074F SYST BP LT 130 MM HG: CPT | Mod: CPTII,S$GLB,, | Performed by: NURSE PRACTITIONER

## 2023-02-15 PROCEDURE — 3078F DIAST BP <80 MM HG: CPT | Mod: CPTII,S$GLB,, | Performed by: NURSE PRACTITIONER

## 2023-02-15 PROCEDURE — 3074F PR MOST RECENT SYSTOLIC BLOOD PRESSURE < 130 MM HG: ICD-10-PCS | Mod: CPTII,S$GLB,, | Performed by: NURSE PRACTITIONER

## 2023-02-15 PROCEDURE — 1159F MED LIST DOCD IN RCRD: CPT | Mod: CPTII,S$GLB,, | Performed by: NURSE PRACTITIONER

## 2023-02-15 PROCEDURE — 99999 PR PBB SHADOW E&M-EST. PATIENT-LVL III: CPT | Mod: PBBFAC,,, | Performed by: NURSE PRACTITIONER

## 2023-02-15 PROCEDURE — 3008F BODY MASS INDEX DOCD: CPT | Mod: CPTII,S$GLB,, | Performed by: NURSE PRACTITIONER

## 2023-02-15 PROCEDURE — 1159F PR MEDICATION LIST DOCUMENTED IN MEDICAL RECORD: ICD-10-PCS | Mod: CPTII,S$GLB,, | Performed by: NURSE PRACTITIONER

## 2023-02-15 PROCEDURE — 99213 OFFICE O/P EST LOW 20 MIN: CPT | Mod: S$GLB,,, | Performed by: NURSE PRACTITIONER

## 2023-02-15 PROCEDURE — 3078F PR MOST RECENT DIASTOLIC BLOOD PRESSURE < 80 MM HG: ICD-10-PCS | Mod: CPTII,S$GLB,, | Performed by: NURSE PRACTITIONER

## 2023-02-15 RX ORDER — TAMSULOSIN HYDROCHLORIDE 0.4 MG/1
2 CAPSULE ORAL
COMMUNITY
Start: 2023-01-20

## 2023-02-15 RX ORDER — VALACYCLOVIR HYDROCHLORIDE 1 G/1
1000 TABLET, FILM COATED ORAL DAILY
Qty: 90 TABLET | Refills: 1 | Status: SHIPPED | OUTPATIENT
Start: 2023-02-15 | End: 2023-09-14 | Stop reason: SDUPTHER

## 2023-02-15 NOTE — PROGRESS NOTES
HPI     Chief Complaint  Chief Complaint   Patient presents with    Medication Refill       HPI  Trey Austin is a 43 y.o. male with multiple medical diagnoses as listed in the medical history and problem list that presents for Shift Work Sleep Disorder. This patient is new to me.    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 Ambien works with getting him to sleep quickly but he wakes at approx 3 am nightly. Has not used Ambien or Modafinil in quite some time. Does not need refills on either of the medications.Trazodone has not worked in the past. Benadryl or Vistaril does not work. Ativan worked some time ago. Reports Xanax works similar to Ambien with getting him to sleep but he is not able to stay asleep      History     PAST MEDICAL HISTORY:  Past Medical History:   Diagnosis Date    Herpes simplex     Shift work sleep disorder 5/30/2019       PAST SURGICAL HISTORY:  Past Surgical History:   Procedure Laterality Date    perforated samll intestine         SOCIAL HISTORY:  Social History     Socioeconomic History    Marital status:    Tobacco Use    Smoking status: Former    Smokeless tobacco: Never   Substance and Sexual Activity    Alcohol use: Not Currently    Drug use: Never    Sexual activity: Yes       FAMILY HISTORY:  Family History   Problem Relation Age of Onset    Breast cancer Mother     Heart disease Father        ALLERGIES AND MEDICATIONS: updated and reviewed.  Review of patient's allergies indicates:   Allergen Reactions    Erythromycin     Macrolide antibiotics Diarrhea and Nausea And Vomiting     Current Outpatient Medications   Medication Sig Dispense Refill    modafiniL (PROVIGIL) 200 MG Tab TAKE 1 TABLET BY MOUTH DAILY AS NEEDED FOR SHIFT-WORK SLEEP DISORDER 30 tablet 2    tamsulosin (FLOMAX) 0.4 mg Cap Take 2 capsules by mouth.      zolpidem (AMBIEN) 10 mg Tab Take 1 tablet (10 mg total) by mouth nightly as  needed (for insomnia). Take one-half tablet (5 mg) or one tablet (10 mg) at bedtime as needed for insomnia 30 tablet 1    valACYclovir (VALTREX) 1000 MG tablet Take 1 tablet (1,000 mg total) by mouth once daily. 90 tablet 1     No current facility-administered medications for this visit.       Exam     ROS  Review of Systems   Constitutional:  Negative for appetite change, chills, fatigue and fever.   HENT:  Negative for congestion, ear pain, postnasal drip, rhinorrhea, sneezing, sore throat and tinnitus.    Respiratory:  Negative for cough and shortness of breath.    Cardiovascular:  Negative for chest pain and palpitations.   Gastrointestinal:  Negative for abdominal pain, constipation, diarrhea, nausea and vomiting.   Genitourinary:  Negative for difficulty urinating and dysuria.   Musculoskeletal:  Negative for arthralgias.   Neurological:  Negative for headaches.   Psychiatric/Behavioral:  Positive for sleep disturbance.          Physical Exam  Vitals:    02/15/23 1124   BP: 100/60   BP Location: Left arm   Patient Position: Sitting   BP Method: Large (Manual)   Pulse: 84   Temp: 98.2 °F (36.8 °C)   TempSrc: Tympanic   SpO2: 96%   Weight: 73.5 kg (162 lb 0.6 oz)   Height: 6' (1.829 m)    Body mass index is 21.98 kg/m².  Weight: 73.5 kg (162 lb 0.6 oz)   Height: 6' (182.9 cm)   Physical Exam  Constitutional:       General: He is not in acute distress.     Appearance: Normal appearance. He is well-developed.   HENT:      Head: Normocephalic and atraumatic.      Right Ear: External ear normal.      Left Ear: External ear normal.      Nose: Nose normal.      Mouth/Throat:      Pharynx: No oropharyngeal exudate.   Eyes:      Pupils: Pupils are equal, round, and reactive to light.   Cardiovascular:      Rate and Rhythm: Normal rate and regular rhythm.      Heart sounds: No murmur heard.    No friction rub. No gallop.   Pulmonary:      Effort: Pulmonary effort is normal. No respiratory distress.      Breath sounds:  Normal breath sounds. No wheezing.   Abdominal:      General: Bowel sounds are normal.      Palpations: Abdomen is soft.   Musculoskeletal:      Cervical back: Neck supple.   Lymphadenopathy:      Cervical: No cervical adenopathy.   Skin:     General: Skin is warm and dry.      Capillary Refill: Capillary refill takes less than 2 seconds.   Neurological:      Mental Status: He is alert and oriented to person, place, and time.   Psychiatric:         Mood and Affect: Mood normal.         Health Maintenance         Date Due Completion Date    COVID-19 Vaccine (4 - Booster for Pfizer series) 12/17/2021 10/22/2021    Influenza Vaccine (1) 09/01/2022 10/4/2021    Lipid Panel 02/18/2027 2/18/2022    TETANUS VACCINE 05/30/2029 5/30/2019            Assessment & Plan     Assessment & Plan  Problem List Items Addressed This Visit          ID    Herpes simplex type 2 infection (genital) (Chronic)  -The current medical regimen is effective;  continue present plan and medications.     Relevant Medications    valACYclovir (VALTREX) 1000 MG tablet       Other    Shift work sleep disorder - Primary  -LA  reviewed   - Patient request prescription for Ativan for sleep disorder as the medciation worked in the past     Other Visit Diagnoses       Screening for lipid disorders      - As above              Health Maintenance reviewed: Deferred per  patient    Follow-up: RTC as needed     20+ minutes of total time spent on the encounter, which includes face to face time and non-face to face time preparing to see the patient (eg, review of tests), Obtaining and/or reviewing separately obtained history, documenting clinical information in the electronic or other health record, independently interpreting results (not separately reported) and communicating results to the patient/family/caregiver, or Care coordination (not separately reported).

## 2023-02-19 ENCOUNTER — PATIENT MESSAGE (OUTPATIENT)
Dept: INTERNAL MEDICINE | Facility: CLINIC | Age: 43
End: 2023-02-19
Payer: COMMERCIAL

## 2023-09-07 DIAGNOSIS — B00.9 HERPES SIMPLEX TYPE 2 INFECTION: ICD-10-CM

## 2023-09-07 RX ORDER — VALACYCLOVIR HYDROCHLORIDE 1 G/1
1000 TABLET, FILM COATED ORAL DAILY
Qty: 90 TABLET | Refills: 0 | OUTPATIENT
Start: 2023-09-07

## 2023-09-07 NOTE — TELEPHONE ENCOUNTER
No care due was identified.  [facility] Embedded Care Due Messages. Reference number: [id number].   9/07/2023 9:04:37 AM CDT

## 2023-09-07 NOTE — TELEPHONE ENCOUNTER
Refill Routing Note   Medication(s) are not appropriate for processing by [facility] for the following reason(s):      Patient not seen by provider within 15 months  Required labs outdated    [facility] action(s):  Defer Care Due:  None identified              Appointments  past 12m or future 3m with PCP    Date Provider   Last Visit   2/18/2022 ARLIN Narvaez MD   Next Visit   Visit date not found ARLIN Narvaez MD   ED visits in past 90 days: 0        Note composed:2:35 PM 09/07/2023

## 2023-09-07 NOTE — TELEPHONE ENCOUNTER
----- Message from Jewel Fritz sent at 9/7/2023  9:00 AM CDT -----  Contact: self  ..Type:  RX Refill Request    Who Called: .Trey Austin  Refill or New Rx: Refill   RX Name and Strength: valACYclovir (VALTREX) 1000 MG tablet  How is the patient currently taking it? (ex. 1XDay):1xday   Is this a 30 day or 90 day RX: 30  Preferred Pharmacy with phone number:.  [id number] - [place] - [address] AT [facility] [address] [address]  Phone: [phone] Fax: [phone]  Local or Mail Order: local   Ordering Provider:Brice   Would the patient rather a call back or a response via [facility]?  Call back   Best Call Back Number: .[phone]   Additional Information:

## 2023-09-07 NOTE — TELEPHONE ENCOUNTER
Refill not approved. REASON: In-office appointment required for refills.  Schedule soonest OV with me for annual wellness visit.

## 2023-09-14 ENCOUNTER — OFFICE VISIT (OUTPATIENT)
Dept: INTERNAL MEDICINE | Facility: CLINIC | Age: 43
End: 2023-09-14
Payer: COMMERCIAL

## 2023-09-14 ENCOUNTER — LAB VISIT (OUTPATIENT)
Dept: LAB | Facility: HOSPITAL | Age: 43
End: 2023-09-14
Attending: FAMILY MEDICINE
Payer: COMMERCIAL

## 2023-09-14 VITALS
WEIGHT: 164 LBS | BODY MASS INDEX: 22.25 KG/M2 | RESPIRATION RATE: 18 BRPM | OXYGEN SATURATION: 98 % | TEMPERATURE: 97 F | DIASTOLIC BLOOD PRESSURE: 78 MMHG | SYSTOLIC BLOOD PRESSURE: 110 MMHG | HEART RATE: 70 BPM

## 2023-09-14 DIAGNOSIS — B00.9 HERPES SIMPLEX TYPE 2 INFECTION: ICD-10-CM

## 2023-09-14 DIAGNOSIS — Z00.00 WELLNESS EXAMINATION: ICD-10-CM

## 2023-09-14 DIAGNOSIS — Z23 ENCOUNTER FOR IMMUNIZATION: ICD-10-CM

## 2023-09-14 DIAGNOSIS — G47.00 INSOMNIA, UNSPECIFIED TYPE: Primary | ICD-10-CM

## 2023-09-14 PROCEDURE — 90471 IMMUNIZATION ADMIN: CPT | Mod: S$GLB,,, | Performed by: FAMILY MEDICINE

## 2023-09-14 PROCEDURE — 90715 TDAP VACCINE GREATER THAN OR EQUAL TO 7YO IM: ICD-10-PCS | Mod: S$GLB,,, | Performed by: FAMILY MEDICINE

## 2023-09-14 PROCEDURE — 90471 TDAP VACCINE GREATER THAN OR EQUAL TO 7YO IM: ICD-10-PCS | Mod: S$GLB,,, | Performed by: FAMILY MEDICINE

## 2023-09-14 PROCEDURE — 1159F MED LIST DOCD IN RCRD: CPT | Mod: CPTII,S$GLB,, | Performed by: FAMILY MEDICINE

## 2023-09-14 PROCEDURE — 3078F DIAST BP <80 MM HG: CPT | Mod: CPTII,S$GLB,, | Performed by: FAMILY MEDICINE

## 2023-09-14 PROCEDURE — 3044F HG A1C LEVEL LT 7.0%: CPT | Mod: CPTII,S$GLB,, | Performed by: FAMILY MEDICINE

## 2023-09-14 PROCEDURE — 80061 LIPID PANEL: CPT | Performed by: FAMILY MEDICINE

## 2023-09-14 PROCEDURE — 1159F PR MEDICATION LIST DOCUMENTED IN MEDICAL RECORD: ICD-10-PCS | Mod: CPTII,S$GLB,, | Performed by: FAMILY MEDICINE

## 2023-09-14 PROCEDURE — 36415 COLL VENOUS BLD VENIPUNCTURE: CPT | Performed by: FAMILY MEDICINE

## 2023-09-14 PROCEDURE — 99999 PR PBB SHADOW E&M-EST. PATIENT-LVL III: CPT | Mod: PBBFAC,,, | Performed by: FAMILY MEDICINE

## 2023-09-14 PROCEDURE — 3044F PR MOST RECENT HEMOGLOBIN A1C LEVEL <7.0%: ICD-10-PCS | Mod: CPTII,S$GLB,, | Performed by: FAMILY MEDICINE

## 2023-09-14 PROCEDURE — 99999 PR PBB SHADOW E&M-EST. PATIENT-LVL III: ICD-10-PCS | Mod: PBBFAC,,, | Performed by: FAMILY MEDICINE

## 2023-09-14 PROCEDURE — 84443 ASSAY THYROID STIM HORMONE: CPT | Performed by: FAMILY MEDICINE

## 2023-09-14 PROCEDURE — 3074F SYST BP LT 130 MM HG: CPT | Mod: CPTII,S$GLB,, | Performed by: FAMILY MEDICINE

## 2023-09-14 PROCEDURE — 99396 PREV VISIT EST AGE 40-64: CPT | Mod: 25,S$GLB,, | Performed by: FAMILY MEDICINE

## 2023-09-14 PROCEDURE — 83036 HEMOGLOBIN GLYCOSYLATED A1C: CPT | Performed by: FAMILY MEDICINE

## 2023-09-14 PROCEDURE — 3074F PR MOST RECENT SYSTOLIC BLOOD PRESSURE < 130 MM HG: ICD-10-PCS | Mod: CPTII,S$GLB,, | Performed by: FAMILY MEDICINE

## 2023-09-14 PROCEDURE — 3008F BODY MASS INDEX DOCD: CPT | Mod: CPTII,S$GLB,, | Performed by: FAMILY MEDICINE

## 2023-09-14 PROCEDURE — 80053 COMPREHEN METABOLIC PANEL: CPT | Performed by: FAMILY MEDICINE

## 2023-09-14 PROCEDURE — 3078F PR MOST RECENT DIASTOLIC BLOOD PRESSURE < 80 MM HG: ICD-10-PCS | Mod: CPTII,S$GLB,, | Performed by: FAMILY MEDICINE

## 2023-09-14 PROCEDURE — 3008F PR BODY MASS INDEX (BMI) DOCUMENTED: ICD-10-PCS | Mod: CPTII,S$GLB,, | Performed by: FAMILY MEDICINE

## 2023-09-14 PROCEDURE — 85025 COMPLETE CBC W/AUTO DIFF WBC: CPT | Performed by: FAMILY MEDICINE

## 2023-09-14 PROCEDURE — 90715 TDAP VACCINE 7 YRS/> IM: CPT | Mod: S$GLB,,, | Performed by: FAMILY MEDICINE

## 2023-09-14 PROCEDURE — 99396 PR PREVENTIVE VISIT,EST,40-64: ICD-10-PCS | Mod: 25,S$GLB,, | Performed by: FAMILY MEDICINE

## 2023-09-14 RX ORDER — DARIDOREXANT 25 MG/1
1 TABLET, FILM COATED ORAL NIGHTLY
Qty: 30 TABLET | Refills: 2 | Status: SHIPPED | OUTPATIENT
Start: 2023-09-14 | End: 2023-12-13

## 2023-09-14 RX ORDER — VALACYCLOVIR HYDROCHLORIDE 1 G/1
1000 TABLET, FILM COATED ORAL DAILY
Qty: 90 TABLET | Refills: 3 | Status: SHIPPED | OUTPATIENT
Start: 2023-09-14 | End: 2023-10-13 | Stop reason: SDUPTHER

## 2023-09-14 NOTE — PROGRESS NOTES
Subjective:      Patient ID: Trey Austin is a 43 y.o. male.    Chief Complaint: Medication Refill    Needing some help with sleep when flipping between days and nights.   Has tried trazodone but unable to tolerate because of dry mouth.   Has tried melatonin   Ambien will help with sleep but will wake up it.     Needs tetanus.   Needs wellness labs.   No family hx of colon cancer.       The patient's Health Maintenance was reviewed and the following appears to be due at this time:   Health Maintenance Due   Topic Date Due    COVID-19 Vaccine (4 - Pfizer series) 12/17/2021    Influenza Vaccine (1) 09/01/2023        Past Medical History:  Past Medical History:   Diagnosis Date    Herpes simplex     Shift work sleep disorder 5/30/2019     Past Surgical History:   Procedure Laterality Date    perforated samll intestine       Review of patient's allergies indicates:   Allergen Reactions    Erythromycin     Macrolide antibiotics Diarrhea and Nausea And Vomiting     Social History     Socioeconomic History    Marital status:    Tobacco Use    Smoking status: Former    Smokeless tobacco: Never   Substance and Sexual Activity    Alcohol use: Not Currently    Drug use: Never    Sexual activity: Yes     Family History   Problem Relation Age of Onset    Breast cancer Mother     Heart disease Father        Review of Systems   Constitutional:  Negative for chills.   Respiratory:  Negative for cough and shortness of breath.    Cardiovascular:  Negative for chest pain.   Gastrointestinal:  Negative for constipation, diarrhea, nausea and vomiting.   Psychiatric/Behavioral:  Negative for dysphoric mood. The patient is not nervous/anxious.         Objective:   /78   Pulse 70   Temp 96.9 °F (36.1 °C)   Resp 18   Wt 74.4 kg (164 lb 0.4 oz)   SpO2 98%   BMI 22.25 kg/m²     Physical Exam  Vitals and nursing note reviewed.   Constitutional:       Appearance: Normal appearance.   HENT:      Head: Normocephalic.   Eyes:       Conjunctiva/sclera: Conjunctivae normal.   Cardiovascular:      Rate and Rhythm: Normal rate and regular rhythm.   Pulmonary:      Effort: Pulmonary effort is normal.      Breath sounds: Normal breath sounds.   Musculoskeletal:      Cervical back: Normal range of motion and neck supple.   Skin:     General: Skin is warm and dry.   Neurological:      Mental Status: He is alert and oriented to person, place, and time. Mental status is at baseline.   Psychiatric:         Mood and Affect: Mood normal.         Behavior: Behavior normal.       Assessment:     1. Insomnia, unspecified type    2. Herpes simplex type 2 infection    3. Wellness examination    4. Encounter for immunization      Plan:       1. Insomnia, unspecified type  Comments:  has tried numerous agents, none have worked; will try pt on Quvivq  Orders:  -     daridorexant (QUVIVIQ) 25 mg Tab; Take 1 tablet by mouth every evening.  Dispense: 30 tablet; Refill: 2    2. Herpes simplex type 2 infection  -     valACYclovir (VALTREX) 1000 MG tablet; Take 1 tablet (1,000 mg total) by mouth once daily.  Dispense: 90 tablet; Refill: 3    3. Wellness examination  Comments:  discussed preventative health measures; continue efforts towards a health lifestyle  Orders:  -     CBC Auto Differential; Future; Expected date: 09/14/2023  -     Comprehensive Metabolic Panel; Future; Expected date: 09/14/2023  -     Hemoglobin A1C; Future; Expected date: 09/14/2023  -     Lipid Panel; Future; Expected date: 09/14/2023  -     TSH; Future; Expected date: 09/14/2023    4. Encounter for immunization  -     (In Office Administered) Tdap Vaccine         Medication List with Changes/Refills   New Medications    DARIDOREXANT (QUVIVIQ) 25 MG TAB    Take 1 tablet by mouth every evening.   Current Medications    TAMSULOSIN (FLOMAX) 0.4 MG CAP    Take 2 capsules by mouth.    ZOLPIDEM (AMBIEN) 10 MG TAB    Take 1 tablet (10 mg total) by mouth nightly as needed (for insomnia). Take  one-half tablet (5 mg) or one tablet (10 mg) at bedtime as needed for insomnia   Changed and/or Refilled Medications    Modified Medication Previous Medication    VALACYCLOVIR (VALTREX) 1000 MG TABLET valACYclovir (VALTREX) 1000 MG tablet       Take 1 tablet (1,000 mg total) by mouth once daily.    Take 1 tablet (1,000 mg total) by mouth once daily.   Discontinued Medications    MODAFINIL (PROVIGIL) 200 MG TAB    TAKE 1 TABLET BY MOUTH DAILY AS NEEDED FOR SHIFT-WORK SLEEP DISORDER                PATIENT INSTRUCTIONS:   There are no Patient Instructions on file for this visit.     Follow up in about 1 year (around 9/14/2024) for Wellness.

## 2023-09-15 LAB
ALBUMIN SERPL BCP-MCNC: 4.5 G/DL (ref 3.5–5.2)
ALP SERPL-CCNC: 67 U/L (ref 55–135)
ALT SERPL W/O P-5'-P-CCNC: 28 U/L (ref 10–44)
ANION GAP SERPL CALC-SCNC: 12 MMOL/L (ref 8–16)
AST SERPL-CCNC: 24 U/L (ref 10–40)
BASOPHILS # BLD AUTO: 0.07 K/UL (ref 0–0.2)
BASOPHILS NFR BLD: 0.9 % (ref 0–1.9)
BILIRUB SERPL-MCNC: 0.8 MG/DL (ref 0.1–1)
BUN SERPL-MCNC: 15 MG/DL (ref 6–20)
CALCIUM SERPL-MCNC: 10 MG/DL (ref 8.7–10.5)
CHLORIDE SERPL-SCNC: 102 MMOL/L (ref 95–110)
CHOLEST SERPL-MCNC: 198 MG/DL (ref 120–199)
CHOLEST/HDLC SERPL: 3.7 {RATIO} (ref 2–5)
CO2 SERPL-SCNC: 26 MMOL/L (ref 23–29)
CREAT SERPL-MCNC: 1.4 MG/DL (ref 0.5–1.4)
DIFFERENTIAL METHOD: NORMAL
EOSINOPHIL # BLD AUTO: 0.1 K/UL (ref 0–0.5)
EOSINOPHIL NFR BLD: 1.3 % (ref 0–8)
ERYTHROCYTE [DISTWIDTH] IN BLOOD BY AUTOMATED COUNT: 13.2 % (ref 11.5–14.5)
EST. GFR  (NO RACE VARIABLE): >60 ML/MIN/1.73 M^2
ESTIMATED AVG GLUCOSE: 100 MG/DL (ref 68–131)
GLUCOSE SERPL-MCNC: 84 MG/DL (ref 70–110)
HBA1C MFR BLD: 5.1 % (ref 4–5.6)
HCT VFR BLD AUTO: 47.8 % (ref 40–54)
HDLC SERPL-MCNC: 54 MG/DL (ref 40–75)
HDLC SERPL: 27.3 % (ref 20–50)
HGB BLD-MCNC: 15.5 G/DL (ref 14–18)
IMM GRANULOCYTES # BLD AUTO: 0.01 K/UL (ref 0–0.04)
IMM GRANULOCYTES NFR BLD AUTO: 0.1 % (ref 0–0.5)
LDLC SERPL CALC-MCNC: 122.6 MG/DL (ref 63–159)
LYMPHOCYTES # BLD AUTO: 1.7 K/UL (ref 1–4.8)
LYMPHOCYTES NFR BLD: 21.5 % (ref 18–48)
MCH RBC QN AUTO: 30.4 PG (ref 27–31)
MCHC RBC AUTO-ENTMCNC: 32.4 G/DL (ref 32–36)
MCV RBC AUTO: 94 FL (ref 82–98)
MONOCYTES # BLD AUTO: 0.6 K/UL (ref 0.3–1)
MONOCYTES NFR BLD: 7.4 % (ref 4–15)
NEUTROPHILS # BLD AUTO: 5.5 K/UL (ref 1.8–7.7)
NEUTROPHILS NFR BLD: 68.8 % (ref 38–73)
NONHDLC SERPL-MCNC: 144 MG/DL
NRBC BLD-RTO: 0 /100 WBC
PLATELET # BLD AUTO: 236 K/UL (ref 150–450)
PMV BLD AUTO: 11.4 FL (ref 9.2–12.9)
POTASSIUM SERPL-SCNC: 5.2 MMOL/L (ref 3.5–5.1)
PROT SERPL-MCNC: 7.1 G/DL (ref 6–8.4)
RBC # BLD AUTO: 5.1 M/UL (ref 4.6–6.2)
SODIUM SERPL-SCNC: 140 MMOL/L (ref 136–145)
TRIGL SERPL-MCNC: 107 MG/DL (ref 30–150)
TSH SERPL DL<=0.005 MIU/L-ACNC: 0.91 UIU/ML (ref 0.4–4)
WBC # BLD AUTO: 8 K/UL (ref 3.9–12.7)

## 2023-09-19 ENCOUNTER — PATIENT MESSAGE (OUTPATIENT)
Dept: INTERNAL MEDICINE | Facility: CLINIC | Age: 43
End: 2023-09-19
Payer: COMMERCIAL

## 2023-09-19 DIAGNOSIS — E87.5 HYPERKALEMIA: Primary | ICD-10-CM

## 2023-09-21 ENCOUNTER — PATIENT MESSAGE (OUTPATIENT)
Dept: INTERNAL MEDICINE | Facility: CLINIC | Age: 43
End: 2023-09-21
Payer: COMMERCIAL

## 2023-09-25 ENCOUNTER — LAB VISIT (OUTPATIENT)
Dept: LAB | Facility: HOSPITAL | Age: 43
End: 2023-09-25
Attending: FAMILY MEDICINE
Payer: COMMERCIAL

## 2023-09-25 DIAGNOSIS — E87.5 HYPERKALEMIA: ICD-10-CM

## 2023-09-25 LAB
ANION GAP SERPL CALC-SCNC: 6 MMOL/L (ref 8–16)
BUN SERPL-MCNC: 14 MG/DL (ref 6–20)
CALCIUM SERPL-MCNC: 9.3 MG/DL (ref 8.7–10.5)
CHLORIDE SERPL-SCNC: 106 MMOL/L (ref 95–110)
CO2 SERPL-SCNC: 28 MMOL/L (ref 23–29)
CREAT SERPL-MCNC: 0.9 MG/DL (ref 0.5–1.4)
EST. GFR  (NO RACE VARIABLE): >60 ML/MIN/1.73 M^2
GLUCOSE SERPL-MCNC: 67 MG/DL (ref 70–110)
POTASSIUM SERPL-SCNC: 4.5 MMOL/L (ref 3.5–5.1)
SODIUM SERPL-SCNC: 140 MMOL/L (ref 136–145)

## 2023-09-25 PROCEDURE — 80048 BASIC METABOLIC PNL TOTAL CA: CPT | Performed by: FAMILY MEDICINE

## 2023-09-25 PROCEDURE — 36415 COLL VENOUS BLD VENIPUNCTURE: CPT | Performed by: FAMILY MEDICINE

## 2024-09-16 DIAGNOSIS — B00.9 HERPES SIMPLEX TYPE 2 INFECTION: ICD-10-CM

## 2024-09-18 ENCOUNTER — TELEPHONE (OUTPATIENT)
Dept: INTERNAL MEDICINE | Facility: CLINIC | Age: 44
End: 2024-09-18
Payer: COMMERCIAL

## 2024-09-18 NOTE — TELEPHONE ENCOUNTER
----- Message from Hilary Hutson sent at 9/18/2024  1:19 PM CDT -----  Contact: [facility] [phone]  Requesting an RX refill or new RX.    Is this a refill or new RX: refill    RX name and strength (copy/paste from chart):  valACYclovir (VALTREX) 1000 MG tablet     Is this a 30 day or 90 day RX: 30    Pharmacy name and phone # (copy/paste from chart):    [id number] - [place] - [address] AT [facility] [address] [address] [address]  Phone: [phone] Fax: [phone]    The doctors have asked that we provide their patients with the following 2 reminders -- prescription refills can take up to 72 hours, and a friendly reminder that in the future you can use your [facility] account to request refills: yes    Pharmacy states they have been trying for some time to get this filled but have not heard back from anyone at the office. Thanks

## 2024-09-20 ENCOUNTER — PATIENT MESSAGE (OUTPATIENT)
Dept: INTERNAL MEDICINE | Facility: CLINIC | Age: 44
End: 2024-09-20
Payer: COMMERCIAL

## 2024-09-20 DIAGNOSIS — B00.9 HERPES SIMPLEX TYPE 2 INFECTION: ICD-10-CM

## 2024-09-20 RX ORDER — VALACYCLOVIR HYDROCHLORIDE 1 G/1
1000 TABLET, FILM COATED ORAL
Qty: 90 TABLET | Refills: 3 | OUTPATIENT
Start: 2024-09-20

## 2024-09-20 RX ORDER — VALACYCLOVIR HYDROCHLORIDE 1 G/1
1000 TABLET, FILM COATED ORAL DAILY
Qty: 90 TABLET | Refills: 0 | OUTPATIENT
Start: 2024-09-20 | End: 2025-09-20

## 2024-09-20 NOTE — TELEPHONE ENCOUNTER
Refill Routing Note   Medication(s) are not appropriate for processing by [facility] for the following reason(s):        Required labs outdated  Patient not seen by provider within 15 months  Responsible provider unclear    [facility] action(s):  Defer   Requires appointment : Yes   Requires labs : Yes             Appointments  past 12m or future 3m with PCP    Date Provider   Last Visit   Visit date not found RALIN Narvaez MD   Next Visit   Visit date not found ARLIN Narvaez MD   ED visits in past 90 days: 0        Note composed:10:49 AM 09/20/2024

## 2024-09-20 NOTE — TELEPHONE ENCOUNTER
Quick DC. Duplicate Request-  med pended in previous encounter, awaiting assessment    Refill Authorization Note   Trey Austin  is requesting a refill authorization.  Brief Assessment and Rationale for Refill:  Quick Discontinue  Medication Therapy Plan:  DUPLICATE    Medication Reconciliation Completed:  No      Comments:   Pended Medication(s)       Requested Prescriptions     Pending Prescriptions Disp Refills    valACYclovir (VALTREX) 1000 MG tablet [Pharmacy Med Name: VALACYCLOVIR 1GM TABLETS] 90 tablet 3     Sig: TAKE 1 TABLET(1000 MG) BY MOUTH EVERY DAY        Duplicate Pended Encounter(s)/ Last Prescribed Details: (includes pharmacy & prescriber details)              Note composed:10:46 AM 09/20/2024

## 2024-09-20 NOTE — TELEPHONE ENCOUNTER
Care Due:                  Date            Visit Type   Department     Provider  --------------------------------------------------------------------------------    Last Visit: None Found      None         None Found  Next Visit: None Scheduled  None         None Found                                                            Last  Test          Frequency    Reason                     Performed    Due Date  --------------------------------------------------------------------------------    Office Visit  15 months..  valACYclovir.............  Not Found    Overdue    CBC.........  12 months..  valACYclovir.............  09- 09-    Cr..........  12 months..  valACYclovir.............  09- 09-    [facility] Embedded Care Due Messages. Reference number: [id number].   9/20/2024 10:12:27 AM CDT

## 2024-09-23 ENCOUNTER — OFFICE VISIT (OUTPATIENT)
Dept: INTERNAL MEDICINE | Facility: CLINIC | Age: 44
End: 2024-09-23
Payer: COMMERCIAL

## 2024-09-23 ENCOUNTER — LAB VISIT (OUTPATIENT)
Dept: LAB | Facility: HOSPITAL | Age: 44
End: 2024-09-23
Attending: NURSE PRACTITIONER
Payer: COMMERCIAL

## 2024-09-23 VITALS
TEMPERATURE: 98 F | BODY MASS INDEX: 23.59 KG/M2 | SYSTOLIC BLOOD PRESSURE: 110 MMHG | HEART RATE: 91 BPM | HEIGHT: 72 IN | DIASTOLIC BLOOD PRESSURE: 62 MMHG | WEIGHT: 174.19 LBS | OXYGEN SATURATION: 96 %

## 2024-09-23 DIAGNOSIS — Z00.00 ENCOUNTER FOR PREVENTATIVE ADULT HEALTH CARE EXAMINATION: Primary | ICD-10-CM

## 2024-09-23 DIAGNOSIS — B00.9 HERPES SIMPLEX TYPE 2 INFECTION: ICD-10-CM

## 2024-09-23 DIAGNOSIS — Z00.00 ENCOUNTER FOR PREVENTATIVE ADULT HEALTH CARE EXAMINATION: ICD-10-CM

## 2024-09-23 LAB
ALBUMIN SERPL BCP-MCNC: 4.3 G/DL (ref 3.5–5.2)
ALP SERPL-CCNC: 68 U/L (ref 55–135)
ALT SERPL W/O P-5'-P-CCNC: 14 U/L (ref 10–44)
ANION GAP SERPL CALC-SCNC: 10 MMOL/L (ref 8–16)
AST SERPL-CCNC: 25 U/L (ref 10–40)
BASOPHILS # BLD AUTO: 0.07 K/UL (ref 0–0.2)
BASOPHILS NFR BLD: 0.7 % (ref 0–1.9)
BILIRUB SERPL-MCNC: 0.5 MG/DL (ref 0.1–1)
BUN SERPL-MCNC: 17 MG/DL (ref 6–20)
CALCIUM SERPL-MCNC: 10.3 MG/DL (ref 8.7–10.5)
CHLORIDE SERPL-SCNC: 103 MMOL/L (ref 95–110)
CHOLEST SERPL-MCNC: 212 MG/DL (ref 120–199)
CHOLEST/HDLC SERPL: 3.7 {RATIO} (ref 2–5)
CO2 SERPL-SCNC: 28 MMOL/L (ref 23–29)
CREAT SERPL-MCNC: 1.4 MG/DL (ref 0.5–1.4)
DIFFERENTIAL METHOD BLD: ABNORMAL
EOSINOPHIL # BLD AUTO: 0.1 K/UL (ref 0–0.5)
EOSINOPHIL NFR BLD: 0.8 % (ref 0–8)
ERYTHROCYTE [DISTWIDTH] IN BLOOD BY AUTOMATED COUNT: 12.5 % (ref 11.5–14.5)
EST. GFR  (NO RACE VARIABLE): >60 ML/MIN/1.73 M^2
ESTIMATED AVG GLUCOSE: 100 MG/DL (ref 68–131)
GLUCOSE SERPL-MCNC: 84 MG/DL (ref 70–110)
HBA1C MFR BLD: 5.1 % (ref 4–5.6)
HCT VFR BLD AUTO: 45.1 % (ref 40–54)
HDLC SERPL-MCNC: 57 MG/DL (ref 40–75)
HDLC SERPL: 26.9 % (ref 20–50)
HGB BLD-MCNC: 15.1 G/DL (ref 14–18)
IMM GRANULOCYTES # BLD AUTO: 0.02 K/UL (ref 0–0.04)
IMM GRANULOCYTES NFR BLD AUTO: 0.2 % (ref 0–0.5)
LDLC SERPL CALC-MCNC: 127.2 MG/DL (ref 63–159)
LYMPHOCYTES # BLD AUTO: 2.1 K/UL (ref 1–4.8)
LYMPHOCYTES NFR BLD: 20.6 % (ref 18–48)
MCH RBC QN AUTO: 31.3 PG (ref 27–31)
MCHC RBC AUTO-ENTMCNC: 33.5 G/DL (ref 32–36)
MCV RBC AUTO: 94 FL (ref 82–98)
MONOCYTES # BLD AUTO: 0.6 K/UL (ref 0.3–1)
MONOCYTES NFR BLD: 6.1 % (ref 4–15)
NEUTROPHILS # BLD AUTO: 7.3 K/UL (ref 1.8–7.7)
NEUTROPHILS NFR BLD: 71.6 % (ref 38–73)
NONHDLC SERPL-MCNC: 155 MG/DL
NRBC BLD-RTO: 0 /100 WBC
PLATELET # BLD AUTO: 236 K/UL (ref 150–450)
PMV BLD AUTO: 11.3 FL (ref 9.2–12.9)
POTASSIUM SERPL-SCNC: 4.6 MMOL/L (ref 3.5–5.1)
PROT SERPL-MCNC: 6.8 G/DL (ref 6–8.4)
RBC # BLD AUTO: 4.82 M/UL (ref 4.6–6.2)
SODIUM SERPL-SCNC: 141 MMOL/L (ref 136–145)
TRIGL SERPL-MCNC: 139 MG/DL (ref 30–150)
WBC # BLD AUTO: 10.21 K/UL (ref 3.9–12.7)

## 2024-09-23 PROCEDURE — 3074F SYST BP LT 130 MM HG: CPT | Mod: CPTII,S$GLB,, | Performed by: NURSE PRACTITIONER

## 2024-09-23 PROCEDURE — G2211 COMPLEX E/M VISIT ADD ON: HCPCS | Mod: S$GLB,,, | Performed by: NURSE PRACTITIONER

## 2024-09-23 PROCEDURE — 99214 OFFICE O/P EST MOD 30 MIN: CPT | Mod: S$GLB,,, | Performed by: NURSE PRACTITIONER

## 2024-09-23 PROCEDURE — 3078F DIAST BP <80 MM HG: CPT | Mod: CPTII,S$GLB,, | Performed by: NURSE PRACTITIONER

## 2024-09-23 PROCEDURE — 1160F RVW MEDS BY RX/DR IN RCRD: CPT | Mod: CPTII,S$GLB,, | Performed by: NURSE PRACTITIONER

## 2024-09-23 PROCEDURE — 1159F MED LIST DOCD IN RCRD: CPT | Mod: CPTII,S$GLB,, | Performed by: NURSE PRACTITIONER

## 2024-09-23 PROCEDURE — 80061 LIPID PANEL: CPT | Performed by: NURSE PRACTITIONER

## 2024-09-23 PROCEDURE — 83036 HEMOGLOBIN GLYCOSYLATED A1C: CPT | Performed by: NURSE PRACTITIONER

## 2024-09-23 PROCEDURE — 3008F BODY MASS INDEX DOCD: CPT | Mod: CPTII,S$GLB,, | Performed by: NURSE PRACTITIONER

## 2024-09-23 PROCEDURE — 36415 COLL VENOUS BLD VENIPUNCTURE: CPT | Performed by: NURSE PRACTITIONER

## 2024-09-23 PROCEDURE — 80053 COMPREHEN METABOLIC PANEL: CPT | Performed by: NURSE PRACTITIONER

## 2024-09-23 PROCEDURE — 3044F HG A1C LEVEL LT 7.0%: CPT | Mod: CPTII,S$GLB,, | Performed by: NURSE PRACTITIONER

## 2024-09-23 PROCEDURE — 85025 COMPLETE CBC W/AUTO DIFF WBC: CPT | Performed by: NURSE PRACTITIONER

## 2024-09-23 PROCEDURE — 99999 PR PBB SHADOW E&M-EST. PATIENT-LVL III: CPT | Mod: PBBFAC,,, | Performed by: NURSE PRACTITIONER

## 2024-09-23 PROCEDURE — 84443 ASSAY THYROID STIM HORMONE: CPT | Performed by: NURSE PRACTITIONER

## 2024-09-23 RX ORDER — VALACYCLOVIR HYDROCHLORIDE 1 G/1
1000 TABLET, FILM COATED ORAL DAILY
Qty: 90 TABLET | Refills: 3 | Status: SHIPPED | OUTPATIENT
Start: 2024-09-23 | End: 2025-09-23

## 2024-09-23 NOTE — PROGRESS NOTES
Subjective:      Patient ID: Trey Austin is a 44 y.o. male.    Chief Complaint: Annual Exam    History of Present Illness    CHIEF COMPLAINT:  Trey presents today for annual visit and medication refill.    MEDICATIONS:  He requests a refill of Valium. He reports discontinuing Flomax and Ambien. He now manages his sleep with melatonin since discontinuing shift work. He denies current use of alcohol or other substances that might interfere with his sleep.    MEDICAL HISTORY:  He had a colonoscopy this year where one polyp was found. He is scheduled for a follow-up colonoscopy in 5 years. He states that the colonoscopy itself was not difficult, but the preparation was challenging, causing hunger and irritation. He recalls that his previous labs showed elevated potassium, which normalized on repeat testing without intervention.    SLEEP:  He previously used Ambien for sleep issues related to shift work. Since his work schedule has changed, he now uses melatonin for sleep management and reports it is effective in helping him fall asleep.    LIFESTYLE AND OCCUPATION:  He runs seven days a week and lifts weights in the morning. 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    SOCIAL HISTORY:  He denies alcohol consumption and substance use.    PHYSICAL CHARACTERISTICS:  He has a tattoo on his arm featuring fruit, bugs, and eyes, inspired by Chinese masters' still life paintings. He explains that the tattoo serves as a reminder of mortality and the importance of appreciating life.      ROS:  General: -fever, -chills, -fatigue, -weight gain, -weight loss  Eyes: -vision changes, -redness, -discharge  ENT: -ear pain, -nasal congestion, -sore throat  Cardiovascular: -chest pain, -palpitations, -lower extremity edema  Respiratory: -cough, -shortness of breath  Gastrointestinal: -abdominal pain, -nausea, -vomiting,  -diarrhea, -constipation, -blood in stool  Genitourinary: -dysuria, -hematuria, -frequency  Musculoskeletal: -joint pain, -muscle pain  Skin: -rash, -lesion  Neurological: -headache, -dizziness, -numbness, -tingling  Psychiatric: -anxiety, -depression, -sleep difficulty          Patient Active Problem List   Diagnosis    Herpes simplex type 2 infection (genital)    Shift work sleep disorder    History of melanoma in situ    History of COVID-19 (01/03/2022)         Current Outpatient Medications:     valACYclovir (VALTREX) 1000 MG tablet, Take 1 tablet (1,000 mg total) by mouth once daily., Disp: 90 tablet, Rfl: 3      Objective:   /62 (BP Location: Left arm, Patient Position: Sitting, BP Method: Medium (Manual))   Pulse 91   Temp 97.6 °F (36.4 °C) (Tympanic)   Ht 6' (1.829 m)   Wt 79 kg (174 lb 2.6 oz)   SpO2 96%   BMI 23.62 kg/m²     Physical Exam             Physical Exam  Vitals and nursing note reviewed.   Constitutional:       General: He is awake. He is not in acute distress.     Appearance: Normal appearance. He is well-developed, well-groomed and normal weight. He is not ill-appearing, toxic-appearing or diaphoretic.   HENT:      Head: Normocephalic.      Right Ear: Tympanic membrane, ear canal and external ear normal. No middle ear effusion. There is no impacted cerumen.      Left Ear: Tympanic membrane, ear canal and external ear normal.  No middle ear effusion. There is no impacted cerumen.      Nose: No congestion or rhinorrhea.      Mouth/Throat:      Pharynx: No oropharyngeal exudate or posterior oropharyngeal erythema.   Neck:      Thyroid: No thyroid mass, thyromegaly or thyroid tenderness.      Vascular: Normal carotid pulses. No carotid bruit or JVD.   Cardiovascular:      Rate and Rhythm: Normal rate and regular rhythm.      Heart sounds: Normal heart sounds.   Pulmonary:      Effort: Pulmonary effort is normal. No tachypnea, bradypnea, accessory muscle usage, prolonged expiration,  respiratory distress or retractions.      Breath sounds: Normal breath sounds. No stridor, decreased air movement or transmitted upper airway sounds. No decreased breath sounds, wheezing, rhonchi or rales.   Abdominal:      General: Abdomen is flat. Bowel sounds are normal. There is no distension.      Palpations: Abdomen is soft. There is no mass.      Tenderness: There is no abdominal tenderness. There is no right CVA tenderness, left CVA tenderness, guarding or rebound. Negative signs include Bonds's sign and McBurney's sign.      Hernia: No hernia is present.   Musculoskeletal:      Cervical back: Full passive range of motion without pain and normal range of motion.      Right lower leg: No edema.      Left lower leg: No edema.   Skin:     General: Skin is warm and dry.   Neurological:      General: No focal deficit present.      Mental Status: He is alert and oriented to person, place, and time.      GCS: GCS eye subscore is 4. GCS verbal subscore is 5. GCS motor subscore is 6.      Gait: Gait normal.   Psychiatric:         Attention and Perception: Attention and perception normal.         Mood and Affect: Mood and affect normal.         Speech: Speech normal.         Behavior: Behavior normal. Behavior is cooperative.         Thought Content: Thought content normal.         Cognition and Memory: Cognition normal.         Judgment: Judgment normal.          Assessment:     1. Encounter for preventative adult health care examination    2. Herpes simplex type 2 infection      Plan:   Assessment & Plan    Reviewed patient's medication regimen and discontinued Flomax and Ambien as patient reports no longer needing them  Considered patient's self-reported excellent lipid panel but emphasized importance of annual lab work for comprehensive health monitoring  Noted patient's recent colonoscopy with 1 polyp found, indicating need for 5-year follow-up  Assessed patient's overall health status, taking into account his  reported regular exercise routine of daily running and weight lifting    MEDICATIONS/SUPPLEMENTS:  - Flomax discontinued.  - Ambien discontinued.  - Valtrex refilled for 1 year supply.  - Current medications continued with 3-month supply at a time.    LABS:  - Annual lab work including lipid panel and potassium level ordered.  - Trey to get labs drawn before next visit.    FOLLOW UP:  - Follow up in 1 year for annual visit.          Follow up if symptoms worsen or fail to improve.

## 2024-09-24 LAB — TSH SERPL DL<=0.005 MIU/L-ACNC: 0.87 UIU/ML (ref 0.4–4)
